# Patient Record
Sex: FEMALE | Employment: OTHER | URBAN - METROPOLITAN AREA
[De-identification: names, ages, dates, MRNs, and addresses within clinical notes are randomized per-mention and may not be internally consistent; named-entity substitution may affect disease eponyms.]

---

## 2018-09-19 ENCOUNTER — OFFICE VISIT (OUTPATIENT)
Dept: VASCULAR SURGERY | Facility: CLINIC | Age: 73
End: 2018-09-19
Payer: COMMERCIAL

## 2018-09-19 DIAGNOSIS — Z53.9 ERRONEOUS ENCOUNTER--DISREGARD: Primary | ICD-10-CM

## 2018-09-19 PROCEDURE — 99204 OFFICE O/P NEW MOD 45 MIN: CPT | Performed by: SURGERY

## 2018-09-19 NOTE — PROGRESS NOTES
"[facility] Consultation    Irasema Stahl Is a 73-year-old female who presents with recurrent bilateral lower extremity varicose veins and leg pain.  She has had apparent vein stripping in the remote past and then had a left lower extremity laser ablation about 1 year ago.  She states she developed significant pain in the left thigh following the laser ablation which was very slow to resolve.  Approximately 6 months after the laser ablation, she developed a new varicosity on her posterior medial left calf which is painful, causing a pinching pain which is worse when she stands for long periods of time and improves with elevation leg and with walking.  She has worn compression hose for more than 3 months but cannot tolerate them as they are very painful for her just below her knees.  She simply cannot wear them.    She has no history of deep vein thrombosis but had superficial thrombophlebitis found at the time of her ultrasound for her most recent laser ablation.  She states that the \"clot was removed.\"    She feels that her symptoms interfere with actives of daily living making it difficult for her to be on her feet for any long periods of time.    Past medical history  Medical: Hypertension and bladder problems.She apparently has had recurrent urinary tract infections is on suppressive antibiotics  Surgical: Laser ablation as above    Medicines: Cephalexin    Allergies: NKA    Social history: She is a non-smoker and does not use alcohol.    12 point review of systems was completed and was reviewed and is significant for 3 pound weight gain, bladder infections, arthritis, leg swelling, headaches, weakness, constipation and heartburn    Physical exam  General: Pleasant female in no acute distress.  She is 5 feet 3 inches tall was 167 pounds  HEENT: Normocephalic, atraumatic.  EOMI.  External ears and nose normal.  Psychiatric: Judgment, insight, mood and affect are normal.  Respiratory: Normal respiratory " effort  Cardiovascular: Pulse is regular  Musculoskeletal: Gait and station are normal the joints of her fingers and toes are without significant deformity.  There is no cyanosis of her nailbeds.  Neurologic: Grossly normal  Extremities: Right lower extremity, she has 8 mm varicosities coursing down the medial and posterior medial right calf..  There are no significant venous stasis changes of the right leg.  There is 1+ edema  In the left lower extremity, she has scattered 4-6 mm varicosities about the left medial and posterior medial calf.  There is a sizable varicosity/bleb-like varicose vein on the posterior medial left calf with very thin skin overlying it.  She is worried that this is at risk for hemorrhage.  4-6 mm varicosities about the medial left calf.  There are no other significant there are a few scattered telangiectasias.  She has 2+ dorsalis pedis and posterior tibial pulses bilaterally.    Impression  CEAP 3 chronic venous insufficiency bilateral lower extremities.  Her symptoms seem to be worse in the left and in the right leg.  She has had apparent bilateral stripping and had laser ablation of a vein in her left leg some 1 year ago.  Her symptoms did not improve significantly after that procedure.    She has worn compression finds them very uncomfortable and not tolerable.    We discussed options of continued conservative management with exercise, dietary measures, weight loss, compression and leg elevation.  The patient has pursued these measures and does not feel that they will be adequate to control her symptoms.    She will need an ultrasound of her left lower extremity veins to look for the source of her pain and varicose veins.  She will return in the near future.    BARRY Lee    Please send a copy of dictation to Dr. Olivia Swenson

## 2018-09-19 NOTE — MR AVS SNAPSHOT
"              After Visit Summary   2018    Irasema Stahl    MRN: [id number]           Patient Information     Date Of Birth          1945        Visit Information        Provider Department      2018 11:00 AM Vasquez Lee MD; [employer] [employer] [facility]      Today's Diagnoses     ERRONEOUS ENCOUNTER--DISREGARD    -  1       Follow-ups after your visit        Who to contact     If you have questions or need follow up information about today's clinic visit or your schedule please contact [facility] directly at [phone].  Normal or non-critical lab and imaging results will be communicated to you by MyChart, letter or phone within 4 business days after the clinic has received the results. If you do not hear from us within 7 days, please contact the clinic through [employer] or phone. If you have a critical or abnormal lab result, we will notify you by phone as soon as possible.  Submit refill requests through [employer] or call your pharmacy and they will forward the refill request to us. Please allow 3 business days for your refill to be completed.          Additional Information About Your Visit        MyChart Information     [employer] lets you send messages to your doctor, view your test results, renew your prescriptions, schedule appointments and more. To sign up, go to [email or web address] . Click on \"Log in\" on the left side of the screen, which will take you to the Welcome page. Then click on \"Sign up Now\" on the right side of the page.     You will be asked to enter the access code listed below, as well as some personal information. Please follow the directions to create your username and password.     Your access code is: [email or web address]  Expires: 2/3/2019  9:53 AM     Your access code will  in 90 days. If you need help or a new code, please call your [place] clinic or [phone].   "      Care EveryWhere ID     This is your Care EveryWhere ID. This could be used by other organizations to access your [place] medical records  [id number]         Blood Pressure from Last 3 Encounters:   No data found for BP    Weight from Last 3 Encounters:   No data found for Wt              Today, you had the following     No orders found for display       Primary Care Provider Office Phone # Fax #    Olivia Swenson [phone]       XXX NO INFO FOUND XXX XXX  XXX [place] [address]        Equal Access to Services     [facility] : Hadii aad ku hadasho Soomaali, waaxda luqadaha, qaybta kaalmada adeegyada, waxay idiin hayaan adekarolyn sigala . So [facility] [phone].    ATENCIÓN: Si habla español, tiene a cazares disposición servicios gratuitos de asistencia lingüística. Llame al [phone].    We comply with applicable federal civil rights laws and [place] laws. We do not discriminate on the basis of race, color, national origin, age, disability, sex, sexual orientation, or gender identity.            Thank you!     Thank you for choosing [facility]  for your care. Our goal is always to provide you with excellent care. Hearing back from our patients is one way we can continue to improve our services. Please take a few minutes to complete the written survey that you may receive in the mail after your visit with us. Thank you!             Your Updated Medication List - Protect others around you: Learn how to safely use, store and throw away your medicines at www.disposemymeds.org.      Notice  As of 9/19/2018 11:59 PM    You have not been prescribed any medications.

## 2018-10-17 ENCOUNTER — APPOINTMENT (OUTPATIENT)
Dept: VASCULAR SURGERY | Facility: CLINIC | Age: 73
End: 2018-10-17
Payer: COMMERCIAL

## 2018-10-17 PROCEDURE — 93971 EXTREMITY STUDY: CPT | Performed by: SURGERY

## 2018-10-24 ENCOUNTER — APPOINTMENT (OUTPATIENT)
Dept: VASCULAR SURGERY | Facility: CLINIC | Age: 73
End: 2018-10-24
Payer: COMMERCIAL

## 2018-10-24 PROCEDURE — 99207 ZZC VEINSOLUTIONS NO CHARGE VISIT: CPT | Performed by: SURGERY

## 2019-09-10 NOTE — TELEPHONE ENCOUNTER
MEDICAL RECORDS REQUEST   [facility]  Urology Clinic  [address] [place] [address]  PHONE: [phone]  Fax: [phone]        FUTURE VISIT INFORMATION                                                   Irasema Stahl : 1945 scheduled for future visit at [facility]    APPOINTMENT INFORMATION:    Date: 19 9:15AM     Provider:  Deisy Darby MD     Reason for Visit/Diagnosis: Bladder infection     REFERRAL INFORMATION:    Referring provider:  Self     Specialty: N/A    Referring providers clinic:  N/A     Clinic contact number:  N/A     RECORDS REQUESTED FOR VISIT                                                     NOTES  STATUS/DETAILS   OFFICE NOTE from referring provider  no   OFFICE NOTE from other specialist  yes   DISCHARGE SUMMARY from hospital  yes   DISCHARGE REPORT from the ER  yes   OPERATIVE REPORT  no   MEDICATION LIST  no   LABS     URINALYSIS (UA)  yes     PRE-VISIT CHECKLIST      Record collection complete Yes- All recs in epic    Appointment appropriately scheduled           (right time/right provider) Yes   MyChart activation If no, please explain: In process    Questionnaire complete If no, please explain: In process      Completed by: Steff Erwin

## 2019-09-13 ENCOUNTER — PRE VISIT (OUTPATIENT)
Dept: UROLOGY | Facility: CLINIC | Age: 74
End: 2019-09-13

## 2019-09-13 NOTE — TELEPHONE ENCOUNTER
Reason for visit: Cristina consult     Relevant information: pt is self referred    Records/imaging/labs/orders: records available    Pt called: no need for a call    At Rooming: dip/pvr

## 2019-09-19 ENCOUNTER — OFFICE VISIT (OUTPATIENT)
Dept: UROLOGY | Facility: CLINIC | Age: 74
End: 2019-09-19
Payer: COMMERCIAL

## 2019-09-19 ENCOUNTER — DOCUMENTATION ONLY (OUTPATIENT)
Dept: CARE COORDINATION | Facility: CLINIC | Age: 74
End: 2019-09-19

## 2019-09-19 ENCOUNTER — PRE VISIT (OUTPATIENT)
Dept: UROLOGY | Facility: CLINIC | Age: 74
End: 2019-09-19

## 2019-09-19 VITALS — WEIGHT: 170 LBS | HEIGHT: 62 IN | BODY MASS INDEX: 31.28 KG/M2

## 2019-09-19 DIAGNOSIS — D05.12 DUCTAL CARCINOMA IN SITU (DCIS) OF LEFT BREAST: ICD-10-CM

## 2019-09-19 DIAGNOSIS — N39.3 FEMALE STRESS INCONTINENCE: ICD-10-CM

## 2019-09-19 DIAGNOSIS — N39.0 RECURRENT UTI: Primary | ICD-10-CM

## 2019-09-19 DIAGNOSIS — R10.2 SUPRAPUBIC PAIN: ICD-10-CM

## 2019-09-19 DIAGNOSIS — K59.00 CONSTIPATION, UNSPECIFIED CONSTIPATION TYPE: ICD-10-CM

## 2019-09-19 PROBLEM — I10 ESSENTIAL HYPERTENSION: Status: ACTIVE | Noted: 2017-07-26

## 2019-09-19 PROBLEM — R07.9 ACUTE CHEST PAIN: Status: ACTIVE | Noted: 2019-01-02

## 2019-09-19 LAB
ALBUMIN UR-MCNC: NEGATIVE MG/DL
APPEARANCE UR: CLEAR
APPEARANCE UR: CLEAR
BILIRUB UR QL STRIP: NEGATIVE
BILIRUB UR QL: NORMAL
COLOR UR AUTO: YELLOW
COLOR UR: YELLOW
GLUCOSE UR STRIP-MCNC: NEGATIVE MG/DL
GLUCOSE URINE: NORMAL MG/DL
HGB UR QL STRIP: ABNORMAL
HGB UR QL: NORMAL
KETONES UR QL: NORMAL MG/DL
KETONES UR STRIP-MCNC: NEGATIVE MG/DL
LEUKOCYTE ESTERASE UR QL STRIP: NEGATIVE
LEUKOCYTE ESTERASE URINE: NORMAL
MUCOUS THREADS #/AREA URNS LPF: PRESENT /LPF
NITRATE UR QL: NEGATIVE
NITRITE UR QL STRIP: NORMAL
PH UR STRIP: 6 PH (ref 5–7)
PH UR STRIP: 7 PH (ref 5–7)
PROTEIN ALBUMIN URINE: NORMAL MG/DL
RBC #/AREA URNS AUTO: <1 /HPF (ref 0–2)
SOURCE: ABNORMAL
SOURCE: NORMAL
SP GR UR STRIP: 1.01 (ref 1–1.03)
SP GR UR STRIP: 1.01 (ref 1–1.03)
SQUAMOUS #/AREA URNS AUTO: 3 /HPF (ref 0–1)
UROBILINOGEN UR QL STRIP: 0.2 EU/DL (ref 0.2–1)
UROBILINOGEN UR STRIP-MCNC: 0 MG/DL (ref 0–2)
WBC #/AREA URNS AUTO: <1 /HPF (ref 0–5)

## 2019-09-19 RX ORDER — FAMOTIDINE 20 MG/1
TABLET, FILM COATED ORAL
COMMUNITY
Start: 2019-07-29 | End: 2020-09-16

## 2019-09-19 RX ORDER — CALCIUM CARBONATE 500 MG/1
1 TABLET, CHEWABLE ORAL DAILY
Status: ON HOLD | COMMUNITY
Start: 2019-05-05 | End: 2020-09-21

## 2019-09-19 RX ORDER — BACLOFEN 20 MG
1 TABLET ORAL AT BEDTIME
COMMUNITY
Start: 2019-08-15

## 2019-09-19 RX ORDER — HYDROCHLOROTHIAZIDE 12.5 MG/1
12.5 CAPSULE ORAL DAILY PRN
COMMUNITY
Start: 2019-09-09

## 2019-09-19 RX ORDER — ACETAMINOPHEN 500 MG
500-1000 TABLET ORAL EVERY 8 HOURS PRN
COMMUNITY

## 2019-09-19 RX ORDER — AMLODIPINE BESYLATE 2.5 MG/1
2.5 TABLET ORAL AT BEDTIME
COMMUNITY

## 2019-09-19 RX ORDER — ACETAMINOPHEN 160 MG
1 TABLET,DISINTEGRATING ORAL DAILY
COMMUNITY
Start: 2019-08-15

## 2019-09-19 RX ORDER — ESTRADIOL 0.1 MG/G
0.25 CREAM VAGINAL
COMMUNITY
Start: 2016-11-09 | End: 2020-09-16

## 2019-09-19 RX ORDER — MUPIROCIN 20 MG/G
OINTMENT TOPICAL
COMMUNITY
Start: 2019-07-17 | End: 2020-09-16

## 2019-09-19 RX ORDER — ASPIRIN 81 MG/1
81 TABLET, CHEWABLE ORAL DAILY
COMMUNITY

## 2019-09-19 RX ORDER — TRAZODONE HYDROCHLORIDE 50 MG/1
50 TABLET, FILM COATED ORAL
COMMUNITY
Start: 2019-09-13

## 2019-09-19 ASSESSMENT — PAIN SCALES - GENERAL: PAINLEVEL: MILD PAIN (2)

## 2019-09-19 ASSESSMENT — MIFFLIN-ST. JEOR: SCORE: 1234.36

## 2019-09-19 NOTE — PATIENT INSTRUCTIONS
Please do the blood work and CT scan    We will let you know the results of the urine test    Please return for a cystoscopy (procedure to look in the bladder) and pelvic exam    It was a pleasure meeting with you today.  Thank you for allowing me and my team the privilege of caring for you today.  YOU are the reason we are here, and I truly hope we provided you with the excellent service you deserve.  Please let us know if there is anything else we can do for you so that we can be sure you are leaving completely satisfied with your care experience.    Cystoscopy    Cystoscopy is a procedure that lets your doctor look directly inside your urethra and bladder. It can be used to:    Help diagnose a problem with your urethra, bladder, or kidneys.    Take a sample (biopsy) of bladder or urethral tissue.    Treat certain problems (such as removing kidney stones).    Place a stent to bypass an obstruction.    Take special X-rays of the kidneys.  Based on the findings, your doctor may recommend other tests or treatments.  What is a cystoscope?  A cystoscope is a telescope-like instrument that contains lenses and fiberoptics (small glass wires that make bright light). The cystoscope may be straight and rigid, or flexible to bend around curves in the urethra. The doctor may look directly into the cystoscope, or project the image onto a monitor.  Getting ready    Ask your doctor if you should stop taking any medicines before the procedure.    Follow any other instructions your doctor gives you.  Tell your doctor before the exam if you:    Take any medicines, such as aspirin or blood thinners    Have allergies to any medicines    Are pregnant   The procedure  Cystoscopy is done in the doctor s office, surgery center, or hospital. The doctor and a nurse are present during the procedure. It takes only a few minutes, longer if a biopsy, X-ray, or treatment needs to be done.  During the procedure:    You lie on an exam table on  your back, knees bent and legs apart. You are covered with a drape.    Your urethra and the area around it are washed. Anesthetic jelly may be applied to numb the urethra.    The cystoscope is inserted. A sterile fluid is put into the bladder to expand it. You may feel pressure from this fluid.    When the procedure is done, the cystoscope is removed.  After the procedure   Once you re home:    Drink plenty of fluids.    You may have burning or light bleeding when you urinate--this is normal.    Medicines may be prescribed to ease any discomfort or prevent infection. Take these as directed.    Call your doctor if you have heavy bleeding or blood clots, burning that lasts more than a day, a fever over 100 F  (38  C), or trouble urinating.  Date Last Reviewed: 1/1/2017 2000-2017 [employer]. [address] [place] [address]. All rights reserved. This information is not intended as a substitute for professional medical care. Always follow your healthcare professional's instructions.

## 2019-09-19 NOTE — LETTER
9/19/2019       RE: Irasema Stahl  [address]  [facility] [address]     Dear Colleague,    Thank you for referring your patient, Irasema Stahl, to the [facility] UROLOGY AND INST FOR PROSTATE AND UROLOGIC CANCERS at [facility]. Please see a copy of my visit note below.    We are pleased to see Ms. Irasema Stahl in consultation at the request of Dr. Peters for the evaluation of chief complaint listed below    Chief Complaint:    Dull pain in abdomen         History of Present Illness:   Irasema Stahl is a(n) 74 year old female w/ PMH breast cancer and long urologic history of frequent UTIs and urinary leakage presenting with constant dull suprapubic pain. For the past 8 years she has had symptoms of dysuria, increased frequency that have been diagnosed as UTIs and treated with cephalexin. She sees Dr. Peters, her ob/gyn, who has treated her UTIs multiple times. Most recently she had a UTI diagnosed on 8/28/2019 growing Enteroccos faecalis.. She took a 5 day course of cipro, remained symptomatic and returned to her physician, and was prescribed a 4 day course of ampicillin. Currently she has gone back to taking her cephalexin (as she has taken in the past) due to continued symptoms of dull suprapubic pain. She endorses leakage with coughing and valsalva. She wears a pad and changes it 3-4 times per day (mimial leakage, but for comfort). She currently denies gross hematuria, foul smelling urine.         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        Smoking: never  Alcohol: never  IV Drug Use: None         Family History:   History reviewed. No pertinent  "family history.  No urologic cancers in the family.         Allergies:   No Known Allergies         Medications:     Current Outpatient Medications   Medication Sig     acetaminophen (TYLENOL) 500 MG tablet Take 500-1,000 mg by mouth     amLODIPine (NORVASC) 2.5 MG tablet Take 2.5 mg by mouth     aspirin (ASA) 81 MG chewable tablet Take 81 mg by mouth     CALCIUM ANTACID 500 MG chewable tablet      cephALEXin (KEFLEX) 250 MG capsule      cholecalciferol (VITAMIN D3) 1000 units (25 mcg) capsule      Cholecalciferol (VITAMIN D3) 2000 units CAPS      estradiol (ESTRACE) 0.1 MG/GM vaginal cream Place 0.25 g vaginally     famotidine (PEPCID) 20 MG tablet      hydrochlorothiazide (MICROZIDE) 12.5 MG capsule      Magnesium Oxide 500 MG TABS      mupirocin (BACTROBAN) 2 % external ointment      traZODone (DESYREL) 50 MG tablet      No current facility-administered medications for this visit.             REVIEW OF SYSTEMS:    See HPI for pertinent details.  Remainder of 10-point ROS negative.         PHYSICAL EXAM   Ht 1.575 m (5' 2\")   Wt 77.1 kg (170 lb)   BMI 31.09 kg/m     Body mass index is 31.09 kg/m .  GENERAL: No acute distress. Well nourished.   HEENT:  Sclerae anicteric.  Conjunctivae pink.  Moist mucous membranes.  NECK:  Supple.  No lymphadenopathy.  CARDIAC:  Non cyanotic.  LUNGS:  Non-labored breathing  BACK:  No costovertebral tenderness.  ABDOMEN: Soft, non-tender, non distended.  SKIN: No rashes.  Dry.     EXTREMITIES:  Warm, well perfused.  mild lower extremity edema bilaterally.  NEURO: normal gait, no focal deficits.     PVR 0 mL.          LABS AND IMAGING:   Urinalysis 9/19 negative except for trace blood.          ASSESSMENT:   Irasema Stahl is a 74 year old female with history of breast cancer and long urologic history of recurrent UTIs, stress incontinence, constipation presents to clinic with continued dull, suprapubic pain that is not resolved with course of 2 antibiotics after most recently being " diagnosed with UTI on 8/28/19. Workup required for persistent UTIs.          PLAN:       Cystoscopy at next available appointment, with urine culture one week prior.    CT Urogram.     Ureaplasma and mycoplasma urine culture.    Check BMP.    Timothy Santoyo MD  Urology Resident    Addendum:    The patient was seen and evaluated with the resident.  The plan was formulated in conjunction with me and I agree with the above note with changes made as necessary.    25 minutes were spent with patient today, >50% in counseling and coordination of care    Deisy Darby MD MPH   of Urology    CC  Patient Care Team:  Olivia Swenson MD as PCP - General  Ziggy Patricia MD as MD (Orthopedics)    Copy to patient  HALEY KLEINHIDONALD  [address] [place] [id number]

## 2019-09-19 NOTE — PROGRESS NOTES
We are pleased to see Ms. Irasema Stahl in consultation at the request of Dr. Peters for the evaluation of chief complaint listed below    Chief Complaint:    Dull pain in abdomen         History of Present Illness:   Irasema Stahl is a(n) 74 year old female w/ PMH breast cancer and long urologic history of frequent UTIs and urinary leakage presenting with constant dull suprapubic pain. For the past 8 years she has had symptoms of dysuria, increased frequency that have been diagnosed as UTIs and treated with cephalexin. She sees Dr. Peters, her ob/gyn, who has treated her UTIs multiple times. Most recently she had a UTI diagnosed on 8/28/2019 growing Enteroccos faecalis.. She took a 5 day course of cipro, remained symptomatic and returned to her physician, and was prescribed a 4 day course of ampicillin. Currently she has gone back to taking her cephalexin (as she has taken in the past) due to continued symptoms of dull suprapubic pain. She endorses leakage with coughing and valsalva. She wears a pad and changes it 3-4 times per day (mimial leakage, but for comfort). She currently denies gross hematuria, foul smelling urine.         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        Smoking: never  Alcohol: never  IV Drug Use: None         Family History:   History reviewed. No pertinent family history.  No urologic cancers in the family.         Allergies:   No Known Allergies         Medications:     Current Outpatient Medications   Medication Sig     acetaminophen (TYLENOL) 500 MG tablet Take 500-1,000 mg by mouth     amLODIPine (NORVASC) 2.5 MG tablet Take 2.5 mg by mouth     aspirin (ASA) 81 MG chewable  "tablet Take 81 mg by mouth     CALCIUM ANTACID 500 MG chewable tablet      cephALEXin (KEFLEX) 250 MG capsule      cholecalciferol (VITAMIN D3) 1000 units (25 mcg) capsule      Cholecalciferol (VITAMIN D3) 2000 units CAPS      estradiol (ESTRACE) 0.1 MG/GM vaginal cream Place 0.25 g vaginally     famotidine (PEPCID) 20 MG tablet      hydrochlorothiazide (MICROZIDE) 12.5 MG capsule      Magnesium Oxide 500 MG TABS      mupirocin (BACTROBAN) 2 % external ointment      traZODone (DESYREL) 50 MG tablet      No current facility-administered medications for this visit.             REVIEW OF SYSTEMS:    See HPI for pertinent details.  Remainder of 10-point ROS negative.         PHYSICAL EXAM   Ht 1.575 m (5' 2\")   Wt 77.1 kg (170 lb)   BMI 31.09 kg/m    Body mass index is 31.09 kg/m .  GENERAL: No acute distress. Well nourished.   HEENT:  Sclerae anicteric.  Conjunctivae pink.  Moist mucous membranes.  NECK:  Supple.  No lymphadenopathy.  CARDIAC:  Non cyanotic.  LUNGS:  Non-labored breathing  BACK:  No costovertebral tenderness.  ABDOMEN: Soft, non-tender, non distended.  SKIN: No rashes.  Dry.     EXTREMITIES:  Warm, well perfused.  mild lower extremity edema bilaterally.  NEURO: normal gait, no focal deficits.     PVR 0 mL.          LABS AND IMAGING:   Urinalysis 9/19 negative except for trace blood.          ASSESSMENT:   Irasema Stahl is a 74 year old female with history of breast cancer and long urologic history of recurrent UTIs, stress incontinence, constipation presents to clinic with continued dull, suprapubic pain that is not resolved with course of 2 antibiotics after most recently being diagnosed with UTI on 8/28/19. Workup required for persistent UTIs.          PLAN:       Cystoscopy at next available appointment, with urine culture one week prior.    CT Urogram.     Ureaplasma and mycoplasma urine culture.    Check BMP.    Timothy Santoyo MD  Urology Resident    Addendum:    The patient was seen and " evaluated with the resident.  The plan was formulated in conjunction with me and I agree with the above note with changes made as necessary.    25 minutes were spent with patient today, >50% in counseling and coordination of care    Deisy Darby MD MPH   of Urology    CC  Patient Care Team:  Olivia Swenson MD as PCP - General  Ziggy Patricia MD as MD (Orthopedics)  Deisy Darby MD as MD (Urology)  SELF, REFERRED            Copy to patient  HALEY OWENS  [address] [place] [phone]

## 2019-09-19 NOTE — NURSING NOTE
"Chief Complaint   Patient presents with     Consult     Bladder infection, rUTIs       Height 1.575 m (5' 2\"), weight 77.1 kg (170 lb). Body mass index is 31.09 kg/m .    Patient Active Problem List   Diagnosis     Back disorder     Constipation     Ductal carcinoma in situ (DCIS) of left breast     Essential hypertension     Health care maintenance     HX: breast cancer     Omental infarction (H)     Acute chest pain       No Known Allergies    Current Outpatient Medications   Medication Sig Dispense Refill     acetaminophen (TYLENOL) 500 MG tablet Take 500-1,000 mg by mouth       amLODIPine (NORVASC) 2.5 MG tablet Take 2.5 mg by mouth       aspirin (ASA) 81 MG chewable tablet Take 81 mg by mouth       CALCIUM ANTACID 500 MG chewable tablet        cephALEXin (KEFLEX) 250 MG capsule        cholecalciferol (VITAMIN D3) 1000 units (25 mcg) capsule        Cholecalciferol (VITAMIN D3) 2000 units CAPS        estradiol (ESTRACE) 0.1 MG/GM vaginal cream Place 0.25 g vaginally       famotidine (PEPCID) 20 MG tablet        hydrochlorothiazide (MICROZIDE) 12.5 MG capsule        Magnesium Oxide 500 MG TABS        mupirocin (BACTROBAN) 2 % external ointment        traZODone (DESYREL) 50 MG tablet          Social History     Tobacco Use     Smoking status: Never Smoker     Smokeless tobacco: Never Used   Substance Use Topics     Alcohol use: None     Drug use: None       Peg Marquez, EMT  9/19/2019  9:29 AM     "

## 2019-09-23 ENCOUNTER — ANCILLARY PROCEDURE (OUTPATIENT)
Dept: CT IMAGING | Facility: CLINIC | Age: 74
End: 2019-09-23
Attending: UROLOGY
Payer: COMMERCIAL

## 2019-09-23 DIAGNOSIS — N39.0 RECURRENT UTI: ICD-10-CM

## 2019-09-23 DIAGNOSIS — R10.2 SUPRAPUBIC PAIN: ICD-10-CM

## 2019-09-23 DIAGNOSIS — D05.12 DUCTAL CARCINOMA IN SITU (DCIS) OF LEFT BREAST: ICD-10-CM

## 2019-09-23 LAB
CREAT BLD-MCNC: 0.9 MG/DL (ref 0.52–1.04)
GFR SERPL CREATININE-BSD FRML MDRD: 62 ML/MIN/{1.73_M2}

## 2019-09-23 RX ORDER — IOPAMIDOL 755 MG/ML
104 INJECTION, SOLUTION INTRAVASCULAR ONCE
Status: COMPLETED | OUTPATIENT
Start: 2019-09-23 | End: 2019-09-23

## 2019-09-23 RX ADMIN — IOPAMIDOL 104 ML: 755 INJECTION, SOLUTION INTRAVASCULAR at 09:24

## 2019-09-25 LAB
BACTERIA SPEC CULT: ABNORMAL
SPECIMEN SOURCE: ABNORMAL

## 2019-09-27 LAB
BACTERIA SPEC CULT: NORMAL
SPECIMEN SOURCE: NORMAL

## 2019-10-01 DIAGNOSIS — A49.3 MYCOPLASMA INFECTION: Primary | ICD-10-CM

## 2019-10-02 RX ORDER — DOXYCYCLINE 100 MG/1
100 CAPSULE ORAL 2 TIMES DAILY
Qty: 14 CAPSULE | Refills: 0 | Status: SHIPPED | OUTPATIENT
Start: 2019-10-02 | End: 2019-10-31

## 2019-10-15 ENCOUNTER — PRE VISIT (OUTPATIENT)
Dept: UROLOGY | Facility: CLINIC | Age: 74
End: 2019-10-15

## 2019-10-15 NOTE — TELEPHONE ENCOUNTER
Reason for visit: cystoscopy      Relevant information: constant dull suprapubic pain    Records/imaging/labs/orders: all records available, labs scheduled    Pt called: no need for a call    At Rooming: regular

## 2019-10-24 DIAGNOSIS — N39.0 URINARY TRACT INFECTION: Primary | ICD-10-CM

## 2019-10-24 DIAGNOSIS — N39.0 RECURRENT UTI: ICD-10-CM

## 2019-10-24 DIAGNOSIS — N39.0 URINARY TRACT INFECTION: ICD-10-CM

## 2019-10-24 DIAGNOSIS — R10.2 SUPRAPUBIC PAIN: ICD-10-CM

## 2019-10-24 LAB
ANION GAP SERPL CALCULATED.3IONS-SCNC: 6 MMOL/L (ref 3–14)
BUN SERPL-MCNC: 21 MG/DL (ref 7–30)
CALCIUM SERPL-MCNC: 8.5 MG/DL (ref 8.5–10.1)
CHLORIDE SERPL-SCNC: 104 MMOL/L (ref 94–109)
CO2 SERPL-SCNC: 29 MMOL/L (ref 20–32)
CREAT SERPL-MCNC: 0.81 MG/DL (ref 0.52–1.04)
GFR SERPL CREATININE-BSD FRML MDRD: 72 ML/MIN/{1.73_M2}
GLUCOSE SERPL-MCNC: 98 MG/DL (ref 70–99)
POTASSIUM SERPL-SCNC: 3.4 MMOL/L (ref 3.4–5.3)
SODIUM SERPL-SCNC: 140 MMOL/L (ref 133–144)

## 2019-10-31 ENCOUNTER — OFFICE VISIT (OUTPATIENT)
Dept: UROLOGY | Facility: CLINIC | Age: 74
End: 2019-10-31
Payer: COMMERCIAL

## 2019-10-31 VITALS
BODY MASS INDEX: 31.28 KG/M2 | HEIGHT: 62 IN | WEIGHT: 170 LBS | HEART RATE: 60 BPM | SYSTOLIC BLOOD PRESSURE: 136 MMHG | DIASTOLIC BLOOD PRESSURE: 79 MMHG

## 2019-10-31 DIAGNOSIS — R10.2 SUPRAPUBIC PAIN: ICD-10-CM

## 2019-10-31 DIAGNOSIS — N39.0 RECURRENT UTI: Primary | ICD-10-CM

## 2019-10-31 DIAGNOSIS — N81.2 UTEROVAGINAL PROLAPSE, INCOMPLETE: ICD-10-CM

## 2019-10-31 DIAGNOSIS — M62.89 PELVIC FLOOR DYSFUNCTION: ICD-10-CM

## 2019-10-31 LAB
APPEARANCE UR: CLEAR
BILIRUB UR QL: ABNORMAL
COLOR UR: YELLOW
GLUCOSE URINE: ABNORMAL MG/DL
HGB UR QL: ABNORMAL
KETONES UR QL: ABNORMAL MG/DL
LEUKOCYTE ESTERASE URINE: ABNORMAL
NITRITE UR QL STRIP: ABNORMAL
PH UR STRIP: 5.5 PH (ref 5–7)
PROTEIN ALBUMIN URINE: ABNORMAL MG/DL
SOURCE: ABNORMAL
SP GR UR STRIP: 1.02 (ref 1–1.03)
UROBILINOGEN UR QL STRIP: 0.2 EU/DL (ref 0.2–1)

## 2019-10-31 RX ORDER — LIDOCAINE HYDROCHLORIDE 20 MG/ML
10 JELLY TOPICAL ONCE
Status: COMPLETED | OUTPATIENT
Start: 2019-10-31 | End: 2019-10-31

## 2019-10-31 RX ADMIN — LIDOCAINE HYDROCHLORIDE 10 ML: 20 JELLY TOPICAL at 08:51

## 2019-10-31 ASSESSMENT — MIFFLIN-ST. JEOR: SCORE: 1234.36

## 2019-10-31 ASSESSMENT — PAIN SCALES - GENERAL: PAINLEVEL: NO PAIN (0)

## 2019-10-31 NOTE — PATIENT INSTRUCTIONS
"Websites with free information:    [employer] patient website: www.voicesforpfd.org    Total Control Program: www.totalcontrolprogram.com    If you think you have an infection please come in for a catheterized urine specimen    Please see one of the dedicated pelvic floor physical therapist ([facility] [phone])    Please return to see me in 3 months, sooner if needed    It was a pleasure meeting with you today.  Thank you for allowing me and my team the privilege of caring for you today.  YOU are the reason we are here, and I truly hope we provided you with the excellent service you deserve.  Please let us know if there is anything else we can do for you so that we can be sure you are leaving completely satisfied with your care experience.    AFTER YOUR CYSTOSCOPY        You have just completed a cystoscopy, or \"cysto\", which allowed your physician to learn more about your bladder (or to remove a stent placed after surgery). We suggest that you continue to avoid caffeine, fruit juice, and alcohol for the next 24 hours, however, you are encouraged to return to your normal activities.         A few things that are considered normal after your cystoscopy:     * Small amount of bleeding (or spotting) that clears within the next 24 hours     * Slight burning sensation with urination     * Sensation to of needing to avoid more frequently     * The feeling of \"air\" in your urine     * Mild discomfort that is relieved with Tylenol        Please contact our office promptly if you:     * Develop a fever above 101 degrees     * Are unable to urinate     * Develop bright red blood that does not stop     * Severe pain or swelling         Please contact our office with any concerns or questions @[phone].  "

## 2019-10-31 NOTE — PROGRESS NOTES
"October 31, 2019    Return visit    Patient returns today for follow up.  She is does speak some English but the visit today is conducted with the assistance of a Latvian .  Patient states that the doxycycline helped her symptoms.  She continues on her daily cephalexin. She denies any changes in her health since last visit.    /79   Pulse 60   Ht 1.575 m (5' 2\")   Wt 77.1 kg (170 lb)   BMI 31.09 kg/m    She is comfortable, in no distress, non-labored breathing.  Abdomen is soft, non-tender, non-distended.  Normal external female genitalia.  Negative CST initially but positive after bladder filled for cystoscopy.  Pelvic exam is remarkable for inability to localize the pelvic floor musculature, she has stage II prolapse with the anterior wall at 0    Cystoscopy Note: After informed consent was obtained patient was prepped and draped in the standard fashion.  The flexible cystoscope was inserted into a normal appearing urethral meatus.  The urothelium was carefully examined and there was evidence of squamous metaplasia in the trigone.  There were no tumors, masses, stones, foreign bodies, or other urothelial abnormalities noted.  Bilateral ureteral orifices were noted in the normal orthotopic position and both effluxed clear urine.  The cystoscope was retroflexed and the bladder neck was unremarkable.  The urethra was carefully examined upon removing the cystoscope and was unremarkable.  Patient tolerated the procedure without complications noted.      A/P: 74 year old F with history of breast cancer, Cristina, stress incontinence, constipation, suprapubic pain, stage II prolapse not bothersome    We discussed that pelvic organ prolapse is essentially a woman's hernia and that although bothersome, by itself is not life threatening.  We discussed that there are multiple options for treatment including observation, pessary, and surgery.  She is not bothered by the bulge and thus recommend observation at " this time unless she wants to try a pessary    We discussed how her pelvic floor symptoms are related to the physical exam findings and her pelvic floor myofascial dysfunction.  We discussed how the recommended treatment is dedicated pelvic floor therapy.  We discussed how the pelvic floor physical therapy works and patient is agreeable.  Referral was placed.      Not convinced that all her symptoms are related to infection but she will continue the cephalexin prophylaxis at this time for now    RTC 3 months, sooner if needed    Esthela Darby MD MPH   of Urology    CC  Patient Care Team:  Melchor Martinez MD as PCP - General (Internal Medicine)  Ziggy Patricia MD as MD (Orthopedics)  Esthela Darby MD as MD (Urology)  ESTHELA DARBY

## 2019-10-31 NOTE — NURSING NOTE
"Chief Complaint   Patient presents with     RECHECK     Cystoscopy       Blood pressure 136/79, pulse 60, height 1.575 m (5' 2\"), weight 77.1 kg (170 lb). Body mass index is 31.09 kg/m .    Patient Active Problem List   Diagnosis     Back disorder     Constipation     Ductal carcinoma in situ (DCIS) of left breast     Essential hypertension     Health care maintenance     HX: breast cancer     Omental infarction (H)     Acute chest pain     Recurrent UTI     Suprapubic pain     Female stress incontinence     Uterovaginal prolapse, incomplete       No Known Allergies    Current Outpatient Medications   Medication Sig Dispense Refill     acetaminophen (TYLENOL) 500 MG tablet Take 500-1,000 mg by mouth       amLODIPine (NORVASC) 2.5 MG tablet Take 2.5 mg by mouth       aspirin (ASA) 81 MG chewable tablet Take 81 mg by mouth       CALCIUM ANTACID 500 MG chewable tablet        cephALEXin (KEFLEX) 250 MG capsule        cholecalciferol (VITAMIN D3) 1000 units (25 mcg) capsule        Cholecalciferol (VITAMIN D3) 2000 units CAPS        estradiol (ESTRACE) 0.1 MG/GM vaginal cream Place 0.25 g vaginally       famotidine (PEPCID) 20 MG tablet        hydrochlorothiazide (MICROZIDE) 12.5 MG capsule        Magnesium Oxide 500 MG TABS        mupirocin (BACTROBAN) 2 % external ointment        traZODone (DESYREL) 50 MG tablet        VITAMIN D PO          Social History     Tobacco Use     Smoking status: Never Smoker     Smokeless tobacco: Never Used   Substance Use Topics     Alcohol use: None     Drug use: None       Invasive Procedure Safety Checklist:    Procedure: Cystoscopy    Action: Complete sections and checkboxes as appropriate.    Pre-procedure:  1. Patient ID Verified with 2 identifiers (Darlyn and  or MRN) : YES    2. Procedure and site verified with patient/designee (when able) : YES    3. Accurate consent documentation in medical record : YES    4. H&P (or appropriate assessment) documented in medical record : " N/A  H&P must be up to 30 days prior to procedure an updated within 24 hours of                 Procedure as applicable.     5. Relevant diagnostic and radiology test results appropriately labeled and displayed as applicable : YES    6. Blood products, implants, devices, and/or special equipment available for the procedure as applicable : YES    7. Procedure site(s) marked with provider initials [Exclusions: none] : NO    8. Marking not required. Reason : Yes  Procedure does not require site marking    Time Out:     Time-Out performed immediately prior to starting procedure, including verbal and active participation of all team members addressing: YES    1. Correct patient identity.  2. Confirmed that the correct side and site are marked.  3. An accurate procedure to be done.  4. Agreement on the procedure to be done.  5. Correct patient position.  6. Relevant images and results are properly labeled and appropriately displayed.  7. The need to administer antibiotics or fluids for irrigation purposes during the procedure as applicable.  8. Safety precautions based on patient history or medication use.    During Procedure: Verification of correct person, site, and procedure occurs any time the responsibility for care of the patient is transferred to another member of the care team.    The following medication was given:     MEDICATION: Lidocaine Uro-Jet 2% 200mg (20mg/mL)  ROUTE: Urethral   SITE: Urethra   DOSE: 10mL  LOT #: [id number]  : [employer]   EXPIRATION DATE: 6/21  NDC#: [address]-68   Was there drug waste? No    Prior to injection, verified patient identity using patient's name and date of birth.  Due to injection administration, patient instructed to remain in clinic for 15 minutes  afterwards, and to report any adverse reaction to me immediately.    Drug Amount Wasted:  None.  Vial/Syringe: Single dose vial      Meghan Arboleda CMA  10/31/2019  8:50 AM

## 2019-10-31 NOTE — PROGRESS NOTES
Invasive Procedure Safety Checklist:    Procedure: Cystoscopy    Action: Complete sections and checkboxes as appropriate.    Pre-procedure:  1. Patient ID Verified with 2 identifiers (Darlyn and  or MRN) : YES    2. Procedure and site verified with patient/designee (when able) : YES    3. Accurate consent documentation in medical record : YES    4. H&P (or appropriate assessment) documented in medical record : NO  H&P must be up to 30 days prior to procedure an updated within 24 hours of                 Procedure as applicable.     5. Relevant diagnostic and radiology test results appropriately labeled and displayed as applicable : NO    6. Blood products, implants, devices, and/or special equipment available for the procedure as applicable : NO    7. Procedure site(s) marked with provider initials [Exclusions: ] : NO    8. Marking not required. Reason : Yes  Procedure does not require site marking    Time Out:     Time-Out performed immediately prior to starting procedure, including verbal and active participation of all team members addressing: YES    1. Correct patient identity.  2. Confirmed that the correct side and site are marked.  3. An accurate procedure to be done.  4. Agreement on the procedure to be done.  5. Correct patient position.  6. Relevant images and results are properly labeled and appropriately displayed.  7. The need to administer antibiotics or fluids for irrigation purposes during the procedure as applicable.  8. Safety precautions based on patient history or medication use.    During Procedure: Verification of correct person, site, and procedure occurs any time the responsibility for care of the patient is transferred to another member of the care team.    Tameka Murphy, [facility]

## 2019-10-31 NOTE — LETTER
10/31/2019       RE: Irasema Stahl  [address]  [place] [address]     Dear Colleague,    Thank you for referring your patient, Irasema Stahl, to the [facility] UROLOGY AND INST FOR PROSTATE AND UROLOGIC CANCERS at [facility]. Please see a copy of my visit note below.    Invasive Procedure Safety Checklist:    Procedure: Cystoscopy    Action: Complete sections and checkboxes as appropriate.    Pre-procedure:  1. Patient ID Verified with 2 identifiers (Darlyn and  or MRN) : YES    2. Procedure and site verified with patient/designee (when able) : YES    3. Accurate consent documentation in medical record : YES    4. H&P (or appropriate assessment) documented in medical record : NO  H&P must be up to 30 days prior to procedure an updated within 24 hours of                 Procedure as applicable.     5. Relevant diagnostic and radiology test results appropriately labeled and displayed as applicable : NO    6. Blood products, implants, devices, and/or special equipment available for the procedure as applicable : NO    7. Procedure site(s) marked with provider initials [Exclusions: ] : NO    8. Marking not required. Reason : Yes  Procedure does not require site marking    Time Out:     Time-Out performed immediately prior to starting procedure, including verbal and active participation of all team members addressing: YES    1. Correct patient identity.  2. Confirmed that the correct side and site are marked.  3. An accurate procedure to be done.  4. Agreement on the procedure to be done.  5. Correct patient position.  6. Relevant images and results are properly labeled and appropriately displayed.  7. The need to administer antibiotics or fluids for irrigation purposes during the procedure as applicable.  8. Safety precautions based on patient history or medication use.    During Procedure: Verification of correct person, site, and procedure occurs any time the  "responsibility for care of the patient is transferred to another member of the care team.    Tameka Murphy CMA    October 31, 2019    Return visit    Patient returns today for follow up.  She is does speak some English but the visit today is conducted with the assistance of a Sammarinese .  Patient states that the doxycycline helped her symptoms.  She continues on her daily cephalexin. She denies any changes in her health since last visit.    /79   Pulse 60   Ht 1.575 m (5' 2\")   Wt 77.1 kg (170 lb)   BMI 31.09 kg/m     She is comfortable, in no distress, non-labored breathing.  Abdomen is soft, non-tender, non-distended.  Normal external female genitalia.  Negative CST initially but positive after bladder filled for cystoscopy.  Pelvic exam is remarkable for inability to localize the pelvic floor musculature, she has stage II prolapse with the anterior wall at 0    Cystoscopy Note: After informed consent was obtained patient was prepped and draped in the standard fashion.  The flexible cystoscope was inserted into a normal appearing urethral meatus.  The urothelium was carefully examined and there was evidence of squamous metaplasia in the trigone.  There were no tumors, masses, stones, foreign bodies, or other urothelial abnormalities noted.  Bilateral ureteral orifices were noted in the normal orthotopic position and both effluxed clear urine.  The cystoscope was retroflexed and the bladder neck was unremarkable.  The urethra was carefully examined upon removing the cystoscope and was unremarkable.  Patient tolerated the procedure without complications noted.      A/P: 74 year old F with history of breast cancer, Cristina, stress incontinence, constipation, suprapubic pain, stage II prolapse not bothersome    We discussed that pelvic organ prolapse is essentially a woman's hernia and that although bothersome, by itself is not life threatening.  We discussed that there are multiple options for " treatment including observation, pessary, and surgery.  She is not bothered by the bulge and thus recommend observation at this time unless she wants to try a pessary    We discussed how her pelvic floor symptoms are related to the physical exam findings and her pelvic floor myofascial dysfunction.  We discussed how the recommended treatment is dedicated pelvic floor therapy.  We discussed how the pelvic floor physical therapy works and patient is agreeable.  Referral was placed.      Not convinced that all her symptoms are related to infection but she will continue the cephalexin prophylaxis at this time for now    RTC 3 months, sooner if needed    Esthela Darby MD MPH   of Urology    CC  Patient Care Team:  Melchor Martinez MD as PCP - General (Internal Medicine)  Ziggy Patricia MD as MD (Orthopedics)  Esthela Darby MD as MD (Urology)  ESTHELA DARBY

## 2019-11-01 LAB
BACTERIA SPEC CULT: ABNORMAL
SPECIMEN SOURCE: ABNORMAL

## 2020-01-23 ENCOUNTER — PRE VISIT (OUTPATIENT)
Dept: UROLOGY | Facility: CLINIC | Age: 75
End: 2020-01-23

## 2020-01-23 NOTE — TELEPHONE ENCOUNTER
Reason for visit: Symptom check     Relevant information: pelvic floor dysfunction    Records/imaging/labs/orders: pt referred to physical therapy    Pt called: no need for a call    At Rooming: regular

## 2020-08-20 DIAGNOSIS — Z11.59 ENCOUNTER FOR SCREENING FOR OTHER VIRAL DISEASES: Primary | ICD-10-CM

## 2020-09-16 RX ORDER — SULFAMETHOXAZOLE/TRIMETHOPRIM 800-160 MG
1 TABLET ORAL 2 TIMES DAILY
Status: ON HOLD | COMMUNITY
End: 2020-09-21

## 2020-09-16 RX ORDER — MULTIVIT WITH MINERALS/LUTEIN
1000 TABLET ORAL DAILY PRN
COMMUNITY

## 2020-09-16 RX ORDER — AMITRIPTYLINE HYDROCHLORIDE 10 MG/1
10 TABLET ORAL
Status: ON HOLD | COMMUNITY
End: 2020-09-21

## 2020-09-16 RX ORDER — GABAPENTIN 100 MG/1
100 CAPSULE ORAL 3 TIMES DAILY
Status: ON HOLD | COMMUNITY
End: 2020-09-21

## 2020-09-16 RX ORDER — PANTOPRAZOLE SODIUM 40 MG/1
40 TABLET, DELAYED RELEASE ORAL DAILY
Status: ON HOLD | COMMUNITY
End: 2020-09-21

## 2020-09-16 RX ORDER — ALUMINA, MAGNESIA, AND SIMETHICONE 2400; 2400; 240 MG/30ML; MG/30ML; MG/30ML
10 SUSPENSION ORAL EVERY 4 HOURS PRN
COMMUNITY

## 2020-09-16 RX ORDER — POLYETHYLENE GLYCOL 3350 17 G/17G
1 POWDER, FOR SOLUTION ORAL DAILY PRN
COMMUNITY

## 2020-09-16 SDOH — HEALTH STABILITY: MENTAL HEALTH: HOW OFTEN DO YOU HAVE A DRINK CONTAINING ALCOHOL?: NEVER

## 2020-09-16 ASSESSMENT — MIFFLIN-ST. JEOR: SCORE: 1190.82

## 2020-09-17 DIAGNOSIS — Z11.59 ENCOUNTER FOR SCREENING FOR OTHER VIRAL DISEASES: ICD-10-CM

## 2020-09-17 PROCEDURE — U0003 INFECTIOUS AGENT DETECTION BY NUCLEIC ACID (DNA OR RNA); SEVERE ACUTE RESPIRATORY SYNDROME CORONAVIRUS 2 (SARS-COV-2) (CORONAVIRUS DISEASE [COVID-19]), AMPLIFIED PROBE TECHNIQUE, MAKING USE OF HIGH THROUGHPUT TECHNOLOGIES AS DESCRIBED BY CMS-2020-01-R: HCPCS | Performed by: OBSTETRICS & GYNECOLOGY

## 2020-09-18 LAB
SARS-COV-2 RNA SPEC QL NAA+PROBE: NOT DETECTED
SPECIMEN SOURCE: NORMAL

## 2020-09-21 ENCOUNTER — ANESTHESIA (OUTPATIENT)
Dept: SURGERY | Facility: CLINIC | Age: 75
End: 2020-09-21
Payer: COMMERCIAL

## 2020-09-21 ENCOUNTER — HOSPITAL ENCOUNTER (OUTPATIENT)
Facility: CLINIC | Age: 75
Discharge: HOME | End: 2020-09-22
Attending: OBSTETRICS & GYNECOLOGY | Admitting: OBSTETRICS & GYNECOLOGY
Payer: COMMERCIAL

## 2020-09-21 ENCOUNTER — ANESTHESIA EVENT (OUTPATIENT)
Dept: SURGERY | Facility: CLINIC | Age: 75
End: 2020-09-21
Payer: COMMERCIAL

## 2020-09-21 DIAGNOSIS — Z98.890 POST-OPERATIVE STATE: Primary | ICD-10-CM

## 2020-09-21 PROCEDURE — 25800030 ZZH RX IP 258 OP 636: Performed by: OBSTETRICS & GYNECOLOGY

## 2020-09-21 PROCEDURE — 25000125 ZZHC RX 250: Performed by: OBSTETRICS & GYNECOLOGY

## 2020-09-21 PROCEDURE — 40000171 ZZH STATISTIC PRE-PROCEDURE ASSESSMENT III: Performed by: OBSTETRICS & GYNECOLOGY

## 2020-09-21 PROCEDURE — 25000125 ZZHC RX 250: Performed by: NURSE ANESTHETIST, CERTIFIED REGISTERED

## 2020-09-21 PROCEDURE — 93010 ELECTROCARDIOGRAM REPORT: CPT | Performed by: INTERNAL MEDICINE

## 2020-09-21 PROCEDURE — 27210794 ZZH OR GENERAL SUPPLY STERILE: Performed by: OBSTETRICS & GYNECOLOGY

## 2020-09-21 PROCEDURE — 37000008 ZZH ANESTHESIA TECHNICAL FEE, 1ST 30 MIN: Performed by: OBSTETRICS & GYNECOLOGY

## 2020-09-21 PROCEDURE — 25000132 ZZH RX MED GY IP 250 OP 250 PS 637: Performed by: OBSTETRICS & GYNECOLOGY

## 2020-09-21 PROCEDURE — 37000009 ZZH ANESTHESIA TECHNICAL FEE, EACH ADDTL 15 MIN: Performed by: OBSTETRICS & GYNECOLOGY

## 2020-09-21 PROCEDURE — 25000128 H RX IP 250 OP 636: Performed by: NURSE ANESTHETIST, CERTIFIED REGISTERED

## 2020-09-21 PROCEDURE — 25800030 ZZH RX IP 258 OP 636: Performed by: ANESTHESIOLOGY

## 2020-09-21 PROCEDURE — 25000128 H RX IP 250 OP 636: Performed by: OBSTETRICS & GYNECOLOGY

## 2020-09-21 PROCEDURE — C1771 REP DEV, URINARY, W/SLING: HCPCS | Performed by: OBSTETRICS & GYNECOLOGY

## 2020-09-21 PROCEDURE — 36000093 ZZH SURGERY LEVEL 4 1ST 30 MIN: Performed by: OBSTETRICS & GYNECOLOGY

## 2020-09-21 PROCEDURE — 71000012 ZZH RECOVERY PHASE 1 LEVEL 1 FIRST HR: Performed by: OBSTETRICS & GYNECOLOGY

## 2020-09-21 PROCEDURE — 36000063 ZZH SURGERY LEVEL 4 EA 15 ADDTL MIN: Performed by: OBSTETRICS & GYNECOLOGY

## 2020-09-21 DEVICE — MESH SLING ADVANTAGE MID URETHERAL BLUE M0068502120: Type: IMPLANTABLE DEVICE | Site: VAGINA | Status: FUNCTIONAL

## 2020-09-21 RX ORDER — LIDOCAINE HYDROCHLORIDE AND EPINEPHRINE 10; 10 MG/ML; UG/ML
INJECTION, SOLUTION INFILTRATION; PERINEURAL PRN
Status: DISCONTINUED | OUTPATIENT
Start: 2020-09-21 | End: 2020-09-21 | Stop reason: HOSPADM

## 2020-09-21 RX ORDER — OXYCODONE AND ACETAMINOPHEN 5; 325 MG/1; MG/1
1-2 TABLET ORAL EVERY 4 HOURS PRN
Status: DISCONTINUED | OUTPATIENT
Start: 2020-09-21 | End: 2020-09-22 | Stop reason: HOSPADM

## 2020-09-21 RX ORDER — ONDANSETRON 2 MG/ML
4 INJECTION INTRAMUSCULAR; INTRAVENOUS EVERY 30 MIN PRN
Status: DISCONTINUED | OUTPATIENT
Start: 2020-09-21 | End: 2020-09-21 | Stop reason: HOSPADM

## 2020-09-21 RX ORDER — MEPERIDINE HYDROCHLORIDE 25 MG/ML
12.5 INJECTION INTRAMUSCULAR; INTRAVENOUS; SUBCUTANEOUS
Status: DISCONTINUED | OUTPATIENT
Start: 2020-09-21 | End: 2020-09-21 | Stop reason: HOSPADM

## 2020-09-21 RX ORDER — DEXAMETHASONE SODIUM PHOSPHATE 4 MG/ML
INJECTION, SOLUTION INTRA-ARTICULAR; INTRALESIONAL; INTRAMUSCULAR; INTRAVENOUS; SOFT TISSUE PRN
Status: DISCONTINUED | OUTPATIENT
Start: 2020-09-21 | End: 2020-09-21

## 2020-09-21 RX ORDER — PROPOFOL 10 MG/ML
INJECTION, EMULSION INTRAVENOUS PRN
Status: DISCONTINUED | OUTPATIENT
Start: 2020-09-21 | End: 2020-09-21

## 2020-09-21 RX ORDER — LIDOCAINE HYDROCHLORIDE 10 MG/ML
INJECTION, SOLUTION INFILTRATION; PERINEURAL PRN
Status: DISCONTINUED | OUTPATIENT
Start: 2020-09-21 | End: 2020-09-21

## 2020-09-21 RX ORDER — HYDROCHLOROTHIAZIDE 12.5 MG/1
12.5 CAPSULE ORAL DAILY PRN
Status: DISCONTINUED | OUTPATIENT
Start: 2020-09-21 | End: 2020-09-22 | Stop reason: HOSPADM

## 2020-09-21 RX ORDER — POLYETHYLENE GLYCOL 3350 17 G/17G
17 POWDER, FOR SOLUTION ORAL DAILY
Status: DISCONTINUED | OUTPATIENT
Start: 2020-09-21 | End: 2020-09-22 | Stop reason: HOSPADM

## 2020-09-21 RX ORDER — ONDANSETRON 4 MG/1
4 TABLET, ORALLY DISINTEGRATING ORAL EVERY 6 HOURS PRN
Status: DISCONTINUED | OUTPATIENT
Start: 2020-09-21 | End: 2020-09-22 | Stop reason: HOSPADM

## 2020-09-21 RX ORDER — AMITRIPTYLINE HYDROCHLORIDE 10 MG/1
10 TABLET ORAL AT BEDTIME
Status: DISCONTINUED | OUTPATIENT
Start: 2020-09-21 | End: 2020-09-21 | Stop reason: CLARIF

## 2020-09-21 RX ORDER — SODIUM CHLORIDE, SODIUM LACTATE, POTASSIUM CHLORIDE, CALCIUM CHLORIDE 600; 310; 30; 20 MG/100ML; MG/100ML; MG/100ML; MG/100ML
INJECTION, SOLUTION INTRAVENOUS CONTINUOUS
Status: DISCONTINUED | OUTPATIENT
Start: 2020-09-21 | End: 2020-09-21 | Stop reason: HOSPADM

## 2020-09-21 RX ORDER — TRAZODONE HYDROCHLORIDE 50 MG/1
50 TABLET, FILM COATED ORAL
Status: DISCONTINUED | OUTPATIENT
Start: 2020-09-21 | End: 2020-09-22 | Stop reason: HOSPADM

## 2020-09-21 RX ORDER — ONDANSETRON 4 MG/1
4 TABLET, ORALLY DISINTEGRATING ORAL EVERY 30 MIN PRN
Status: DISCONTINUED | OUTPATIENT
Start: 2020-09-21 | End: 2020-09-21 | Stop reason: HOSPADM

## 2020-09-21 RX ORDER — CEFAZOLIN SODIUM 1 G/3ML
1 INJECTION, POWDER, FOR SOLUTION INTRAMUSCULAR; INTRAVENOUS SEE ADMIN INSTRUCTIONS
Status: DISCONTINUED | OUTPATIENT
Start: 2020-09-21 | End: 2020-09-21 | Stop reason: HOSPADM

## 2020-09-21 RX ORDER — PROCHLORPERAZINE MALEATE 5 MG
5 TABLET ORAL EVERY 6 HOURS PRN
Status: DISCONTINUED | OUTPATIENT
Start: 2020-09-21 | End: 2020-09-22 | Stop reason: HOSPADM

## 2020-09-21 RX ORDER — NALOXONE HYDROCHLORIDE 0.4 MG/ML
.1-.4 INJECTION, SOLUTION INTRAMUSCULAR; INTRAVENOUS; SUBCUTANEOUS
Status: DISCONTINUED | OUTPATIENT
Start: 2020-09-21 | End: 2020-09-21 | Stop reason: HOSPADM

## 2020-09-21 RX ORDER — PHENAZOPYRIDINE HYDROCHLORIDE 200 MG/1
200 TABLET, FILM COATED ORAL ONCE
Status: COMPLETED | OUTPATIENT
Start: 2020-09-21 | End: 2020-09-21

## 2020-09-21 RX ORDER — GABAPENTIN 100 MG/1
100 CAPSULE ORAL 3 TIMES DAILY
Status: DISCONTINUED | OUTPATIENT
Start: 2020-09-21 | End: 2020-09-21

## 2020-09-21 RX ORDER — NALOXONE HYDROCHLORIDE 0.4 MG/ML
.1-.4 INJECTION, SOLUTION INTRAMUSCULAR; INTRAVENOUS; SUBCUTANEOUS
Status: DISCONTINUED | OUTPATIENT
Start: 2020-09-21 | End: 2020-09-22 | Stop reason: HOSPADM

## 2020-09-21 RX ORDER — DIPHENHYDRAMINE HCL 12.5MG/5ML
12.5 LIQUID (ML) ORAL EVERY 6 HOURS PRN
Status: DISCONTINUED | OUTPATIENT
Start: 2020-09-21 | End: 2020-09-22 | Stop reason: HOSPADM

## 2020-09-21 RX ORDER — FENTANYL CITRATE 50 UG/ML
25-50 INJECTION, SOLUTION INTRAMUSCULAR; INTRAVENOUS
Status: DISCONTINUED | OUTPATIENT
Start: 2020-09-21 | End: 2020-09-21 | Stop reason: HOSPADM

## 2020-09-21 RX ORDER — HYDROMORPHONE HYDROCHLORIDE 1 MG/ML
0.2 INJECTION, SOLUTION INTRAMUSCULAR; INTRAVENOUS; SUBCUTANEOUS
Status: DISCONTINUED | OUTPATIENT
Start: 2020-09-21 | End: 2020-09-22 | Stop reason: HOSPADM

## 2020-09-21 RX ORDER — PROPOFOL 10 MG/ML
INJECTION, EMULSION INTRAVENOUS CONTINUOUS PRN
Status: DISCONTINUED | OUTPATIENT
Start: 2020-09-21 | End: 2020-09-21

## 2020-09-21 RX ORDER — BUPIVACAINE HYDROCHLORIDE 5 MG/ML
INJECTION, SOLUTION EPIDURAL; INTRACAUDAL PRN
Status: DISCONTINUED | OUTPATIENT
Start: 2020-09-21 | End: 2020-09-21 | Stop reason: HOSPADM

## 2020-09-21 RX ORDER — AMLODIPINE BESYLATE 2.5 MG/1
2.5 TABLET ORAL AT BEDTIME
Status: DISCONTINUED | OUTPATIENT
Start: 2020-09-21 | End: 2020-09-22 | Stop reason: HOSPADM

## 2020-09-21 RX ORDER — DIPHENHYDRAMINE HYDROCHLORIDE 50 MG/ML
12.5 INJECTION INTRAMUSCULAR; INTRAVENOUS EVERY 6 HOURS PRN
Status: DISCONTINUED | OUTPATIENT
Start: 2020-09-21 | End: 2020-09-22 | Stop reason: HOSPADM

## 2020-09-21 RX ORDER — ONDANSETRON 2 MG/ML
INJECTION INTRAMUSCULAR; INTRAVENOUS PRN
Status: DISCONTINUED | OUTPATIENT
Start: 2020-09-21 | End: 2020-09-21

## 2020-09-21 RX ORDER — CALCIUM CARBONATE 500 MG/1
1 TABLET, CHEWABLE ORAL 2 TIMES DAILY PRN
COMMUNITY

## 2020-09-21 RX ORDER — EPHEDRINE SULFATE 50 MG/ML
INJECTION, SOLUTION INTRAVENOUS PRN
Status: DISCONTINUED | OUTPATIENT
Start: 2020-09-21 | End: 2020-09-21

## 2020-09-21 RX ORDER — CEFAZOLIN SODIUM 2 G/100ML
2 INJECTION, SOLUTION INTRAVENOUS
Status: COMPLETED | OUTPATIENT
Start: 2020-09-21 | End: 2020-09-21

## 2020-09-21 RX ORDER — FENTANYL CITRATE 50 UG/ML
INJECTION, SOLUTION INTRAMUSCULAR; INTRAVENOUS PRN
Status: DISCONTINUED | OUTPATIENT
Start: 2020-09-21 | End: 2020-09-21

## 2020-09-21 RX ORDER — KETOROLAC TROMETHAMINE 15 MG/ML
15 INJECTION, SOLUTION INTRAMUSCULAR; INTRAVENOUS EVERY 6 HOURS
Status: DISCONTINUED | OUTPATIENT
Start: 2020-09-21 | End: 2020-09-22 | Stop reason: HOSPADM

## 2020-09-21 RX ORDER — ONDANSETRON 2 MG/ML
4 INJECTION INTRAMUSCULAR; INTRAVENOUS EVERY 6 HOURS PRN
Status: DISCONTINUED | OUTPATIENT
Start: 2020-09-21 | End: 2020-09-22 | Stop reason: HOSPADM

## 2020-09-21 RX ORDER — GLYCOPYRROLATE 0.2 MG/ML
INJECTION, SOLUTION INTRAMUSCULAR; INTRAVENOUS PRN
Status: DISCONTINUED | OUTPATIENT
Start: 2020-09-21 | End: 2020-09-21

## 2020-09-21 RX ORDER — SODIUM CHLORIDE 9 MG/ML
INJECTION, SOLUTION INTRAVENOUS CONTINUOUS
Status: DISCONTINUED | OUTPATIENT
Start: 2020-09-21 | End: 2020-09-22 | Stop reason: HOSPADM

## 2020-09-21 RX ORDER — LIDOCAINE 40 MG/G
CREAM TOPICAL
Status: DISCONTINUED | OUTPATIENT
Start: 2020-09-21 | End: 2020-09-21 | Stop reason: HOSPADM

## 2020-09-21 RX ADMIN — FENTANYL CITRATE 25 MCG: 50 INJECTION, SOLUTION INTRAMUSCULAR; INTRAVENOUS at 15:30

## 2020-09-21 RX ADMIN — PROPOFOL 150 MG: 10 INJECTION, EMULSION INTRAVENOUS at 13:41

## 2020-09-21 RX ADMIN — SODIUM CHLORIDE, POTASSIUM CHLORIDE, SODIUM LACTATE AND CALCIUM CHLORIDE: 600; 310; 30; 20 INJECTION, SOLUTION INTRAVENOUS at 13:34

## 2020-09-21 RX ADMIN — GLYCOPYRROLATE 0.2 MG: 0.2 INJECTION, SOLUTION INTRAMUSCULAR; INTRAVENOUS at 13:41

## 2020-09-21 RX ADMIN — CEFAZOLIN SODIUM 1 G: 2 INJECTION, SOLUTION INTRAVENOUS at 15:37

## 2020-09-21 RX ADMIN — ONDANSETRON HYDROCHLORIDE 4 MG: 2 INJECTION, SOLUTION INTRAVENOUS at 15:05

## 2020-09-21 RX ADMIN — FENTANYL CITRATE 50 MCG: 50 INJECTION, SOLUTION INTRAMUSCULAR; INTRAVENOUS at 14:02

## 2020-09-21 RX ADMIN — SODIUM CHLORIDE, POTASSIUM CHLORIDE, SODIUM LACTATE AND CALCIUM CHLORIDE: 600; 310; 30; 20 INJECTION, SOLUTION INTRAVENOUS at 15:09

## 2020-09-21 RX ADMIN — FENTANYL CITRATE 25 MCG: 50 INJECTION, SOLUTION INTRAMUSCULAR; INTRAVENOUS at 15:36

## 2020-09-21 RX ADMIN — EPHEDRINE SULFATE 5 MG: 50 INJECTION, SOLUTION INTRAVENOUS at 14:11

## 2020-09-21 RX ADMIN — PROPOFOL 75 MCG/KG/MIN: 10 INJECTION, EMULSION INTRAVENOUS at 13:51

## 2020-09-21 RX ADMIN — FENTANYL CITRATE 100 MCG: 50 INJECTION, SOLUTION INTRAMUSCULAR; INTRAVENOUS at 13:41

## 2020-09-21 RX ADMIN — CEFAZOLIN SODIUM 2 G: 2 INJECTION, SOLUTION INTRAVENOUS at 13:34

## 2020-09-21 RX ADMIN — PHENAZOPYRIDINE 200 MG: 200 TABLET ORAL at 12:25

## 2020-09-21 RX ADMIN — SODIUM CHLORIDE 50 ML/HR: 9 INJECTION, SOLUTION INTRAVENOUS at 22:34

## 2020-09-21 RX ADMIN — DEXAMETHASONE SODIUM PHOSPHATE 4 MG: 4 INJECTION, SOLUTION INTRA-ARTICULAR; INTRALESIONAL; INTRAMUSCULAR; INTRAVENOUS; SOFT TISSUE at 13:41

## 2020-09-21 RX ADMIN — FENTANYL CITRATE 50 MCG: 50 INJECTION, SOLUTION INTRAMUSCULAR; INTRAVENOUS at 15:04

## 2020-09-21 RX ADMIN — KETOROLAC TROMETHAMINE 15 MG: 15 INJECTION, SOLUTION INTRAMUSCULAR; INTRAVENOUS at 17:34

## 2020-09-21 RX ADMIN — ROCURONIUM BROMIDE 5 MG: 10 INJECTION INTRAVENOUS at 13:41

## 2020-09-21 RX ADMIN — LIDOCAINE HYDROCHLORIDE 30 MG: 10 INJECTION, SOLUTION INFILTRATION; PERINEURAL at 13:41

## 2020-09-21 RX ADMIN — EPHEDRINE SULFATE 5 MG: 50 INJECTION, SOLUTION INTRAVENOUS at 14:25

## 2020-09-21 ASSESSMENT — ACTIVITIES OF DAILY LIVING (ADL): AMBULATION: 0-->INDEPENDENT

## 2020-09-21 ASSESSMENT — ENCOUNTER SYMPTOMS: DYSRHYTHMIAS: 0

## 2020-09-21 ASSESSMENT — MIFFLIN-ST. JEOR
SCORE: 1174.13
SCORE: 1145.46

## 2020-09-21 NOTE — OP NOTE
OPERATIVE REPORT     NAME: Irasema Stahl   : 1945   MR#: [id number]     DATE OF OPERATION: 2020     SURGEON: Mandi Mosher MD     PREOPERATIVE DIAGNOSIS:     Incomplete uterovaginal prolapse.     Associated cystocele, rectocele, and enterocele.     Stress urinary incontinence     POSTOP DIAGNOSIS:     Incomplete uterovaginal prolapse.     Associated cystocele, rectocele, and enterocele.     Stress urinary incontinence     PROCEDURES :   1. LeFort colpocleisis repair of pelvic organ prolapse.   2. Anterior, Posterior and Enterocele repairs  3. Levator myorrhaphy   4. Perineorrhaphy  4. Retropubic midurethral sling.   5. Cystourethroscopy.     ASSISTANT:   ARISTEO Caldwell     ANESTHESIA:   GET    ESTIMATED BLOOD LOSS:   150 ml     IV FLUIDS:   1300 ml of crystalloid.     COMPLICATIONS:   None.     DRAINS:     16-Bengali Altman catheter to gravity drainage     PACKING:   none    FINDINGS:     Ureters patent, no bladder lesions     Normal rectal exam at conclusion of surgery     INDICATIONS:   This patient was seen in consultation regarding uterovaginal prolapse and urinary incontinence. Please refer to her clinic documentation for a complete description of her evaluation treatment plan she was desirous of a definitive surgical approach. Prior to the procedure, the risks, benefits, indications, and alternatives were discussed. Both written and verbal consent were obtained.     PROCEDURE:   The patient was brought to the operating suite. She was administered prophylactic IV antibiotics, had sequential compression devices present on her lower extremities. She was administered anesthesia, then carefully positioned in the dorsal lithotomy position with her legs stationed in the Yellofin stirrups with attention to avoiding pressure points. An exam under anesthesia was performed, which noted her cervix and uterus to be normal, no noted adnexal or parametrial masses. Anorectal exam no masses. She was  now sterilely prepped and draped in the usual fashion. Her bladder was drained with a straight catheterization.     LeFort colpocleisis.   The cervix was grasped with an Allis clamp. On the anterior vaginal wall a rhomboid/rectangular shape was drawn with a marking pen. The subepithelial tissue over the bladder was injected with a solution of Marcaine with epinephrine for hydrodistention purposes. Electrocautery was used to score the rectangle and then the tissue within the rectangle was dissected off the underlying perivesical connective tissue and discarded. A 2 cm sheet of epithelium was left on each side, and the posterior wall a rhomboid/rectangular shape was created, injected and the epithelium excised once again. Now in order to create 2 pericervical drainage canals a suture of 2-0 PDS was used to imbricate the cervix and then create the tunnels starting in the midline in the sewing out laterally on both sides. In order to involute the apical prolapse 8 sutures of 0-Vicryl were brought through the perivesical connective tissue in a radial fashion down around the tunnel and cervix and then through the perirectal connective tissue. Once all the sutures were placed the apical prolapse was reduced and the sutures were tied thus eliminating the apical prolapse. The remainder of the drainage tunnels were sewn and tied.     Anterior Colporrhaphy  The lateral perivesical connective tissue was plicated in the midline with interrupted sutures of 0-vicryl.     Retropubic midurethral sling:   Two marks were created on the anterior abdominal wall 2.5 cm lateral to midline at the level of the pubic bone. Attention was turned to the vagina, where an Allis clamp was applied 1 cm distal from the meatus, an additional Allis clamp 2.5 cm from the meatus. Periurethral tissue was injected with a solution of Marcaine with epinephrine. A 1.5 cm incision was created between the 2 Allis clamps beneath the mid urethra in the sagittal  plane. Two periurethral tunnels were then created out laterally towards the pubic bone. Once an adequate dissection had been performed, the Advantage Fit device was selected and loaded. Trocar was brought through the patient's left periurethral tunnel back behind the pubic bone, through the space of Retzius, and out through   the previously created thom on the anterior abdominal wall. The blue stay sheath was left in place. This was repeated in a similar fashion on the patient's right side. The urethra was diverted in ipsilateral fashion during trocar placement. Cystourethroscopy was now performed with the above noted normal findings (patent ureters, no lesions). The cystoscope was removed. The sling material was appropriately positioned beneath the mid urethra with no overt tension. The resultant incision was closed with a running locking suture of 2-0 Vicryl. Excess sling material on the anterior abdominal wall was trimmed. The resultant incisions were closed with Dermabond.     Enterocele repair and  Posterior colporrhaphy.   The enterocele at the apex of the vagina was obliterated using 0-vicryl suture in a circumferential fashion.  The posterior rectocele defect was now imbricated in the midline in layers using interrupted sutures of 0-Vicryl.     Levator myorrhaphy and Perineorrhaphy.   A further dissection was taken out in order to mobilize the levator musculature to allow for a midline plication and closure of the genital hiatus. This was performed with interrupted sutures of 0-Vicryl. The result of the repair was a 1 cm genital hiatus. The posterior repair and perineal body skin was reapproximated with a running locking suture of 2-0 Vicryl internally and a subcuticular suture of the perineal body skin externally.     Anorectal exam showed there to be no injuries or other abnormalities. She had a 16-Tunisian Altman catheter present to gravity drainage. Sponge, lap, and needle counts were found be correct.  There were no complications from surgery. The patient was awoken from anesthesia and brought to the recovery room in excellent condition.

## 2020-09-21 NOTE — ANESTHESIA CARE TRANSFER NOTE
Patient: Irasema Stahl    Procedure(s):  Lefort colpocleisis, enterocele repair, anterior and posterior repair, levator pelvic floor muscle plication, perineorrhaphy, retropubic midurethral sling, cystoscopy  CREATION, VAGINAL SLING, WITH CYSTOSCOPY    Diagnosis: Uterovaginal prolapse [N81.4]  Stress incontinence [N39.3]  Diagnosis Additional Information: No value filed.    Anesthesia Type:   General     Note:  Airway :Face Mask  Patient transferred to:PACU  Comments: [email or web address] Report: Identifed the Patient, Identified the Reponsible Provider, Reviewed the pertinent medical history, Discussed the surgical course, Reviewed Intra-OP anesthesia mangement and issues during anesthesia, Set expectations for post-procedure period and Allowed opportunity for questions and acknowledgement of understanding      Vitals: (Last set prior to Anesthesia Care Transfer)    CRNA VITALS  9/21/2020 1528 - 9/21/2020 1604      9/21/2020             Pulse:  65    SpO2:  98 %                Electronically Signed By: KAROLINE Aviles CRNA  September 21, 2020  4:04 PM

## 2020-09-21 NOTE — PROGRESS NOTES
"UROGYNECOLOGY    POST-OP DAY #1    PROCEDURES :   1. LeFort colpocleisis repair of pelvic organ prolapse.   2. Anterior, Posterior and Enterocele repairs  3. Levator myorrhaphy   4. Perineorrhaphy  4. Retropubic midurethral sling.   5. Cystourethroscopy.     S: Doing well  Ambulating: no  Diet: liquids  Flatus: yes  Pain control: good  Vaginal Pack: none  Altman catheter: in place    O:  /64 (BP Location: Right arm)   Pulse 56   Temp 96.9  F (36.1  C) (Oral)   Resp 16   Ht 1.6 m (5' 3\")   Wt 70 kg (154 lb 5.2 oz)   SpO2 98%   BMI 27.34 kg/m        Intake/Output Summary (Last 24 hours) at 9/22/2020 0533  Last data filed at 9/22/2020 0501  Gross per 24 hour   Intake 1700 ml   Output 2450 ml   Net -750 ml       Appears healthy and well, A&O x3  Abdomen is soft, mons incisions C/D/I, good BS  Ext SCD, no edema  No perineal edema, incisions intact.    HGB:  pre-op GFR 82 creat 0.8   Post-op pending    POD#1  A) Post-Operative Care:    ambulate     ADAT    continue with pain control strategies. Add ice to perineum.    I reviewed post-operative instructions and precautions/ written information provided.    Discharge home around noon today (WHEN MEETING SAFE DISCHARGE CRITERIA). Will be discharged with catheter in place.    Follow-up  1 week, in office, for catheter removal.    B) Medical- stable(good UOP, VSS) restart routine home medications  -HTN  -renal insufficiency  -GERD    Mandi Mosher MD      "

## 2020-09-21 NOTE — ANESTHESIA POSTPROCEDURE EVALUATION
Patient: Irasema Stahl    Procedure(s):  Lefort colpocleisis, enterocele repair, anterior and posterior repair, levator pelvic floor muscle plication, perineorrhaphy, retropubic midurethral sling, cystoscopy  CREATION, VAGINAL SLING, WITH CYSTOSCOPY    Diagnosis:Uterovaginal prolapse [N81.4]  Stress incontinence [N39.3]  Diagnosis Additional Information: No value filed.    Anesthesia Type:  General    Note:  Anesthesia Post Evaluation    Patient location during evaluation: PACU  Patient participation: Able to fully participate in evaluation  Level of consciousness: awake  Pain management: adequate  Airway patency: patent  Cardiovascular status: acceptable  Respiratory status: acceptable  Hydration status: euvolemic  PONV: controlled     Anesthetic complications: None          Last vitals:  Vitals:    09/21/20 1620 09/21/20 1630 09/21/20 1645   BP: 117/68 118/70 117/68   Pulse: 78 69 58   Resp: 9 10 11   Temp:      SpO2: 95% 95% 97%         Electronically Signed By: Inocencio Gilliam MD  September 21, 2020  5:02 PM

## 2020-09-21 NOTE — ANESTHESIA PREPROCEDURE EVALUATION
Anesthesia Pre-Procedure Evaluation    Patient: Irasema Stahl   MRN: [id number] : 1945          Preoperative Diagnosis: Uterovaginal prolapse [N81.4]  Stress incontinence [N39.3]    Procedure(s):  Lefort colpocleisis, enterocele repair, posterior repair, levator pelvic floor muscle plication, perineorrhaphy, retropubic midurethral sling, cystoscopy  CREATION, VAGINAL SLING, WITH CYSTOSCOPY    Past Medical History:   Diagnosis Date     Arthritis     generalized     Ectopic pregnancy      Hypertension      Incontinence in female      Pelvic floor relaxation      Past Surgical History:   Procedure Laterality Date     APPENDECTOMY       AS RAD RESEC TONSIL/PILLARS       ovary duct removal       STRIP VEIN BILATERAL  2017     Anesthesia Evaluation     .             ROS/MED HX    ENT/Pulmonary:  - neg pulmonary ROS     Neurologic:  - neg neurologic ROS     Cardiovascular:     (+) hypertension----. : . . . :. .      (-) CAD, CHF, arrhythmias and pulmonary hypertension   METS/Exercise Tolerance:     Hematologic:         Musculoskeletal:   (+) arthritis,  other musculoskeletal- Chronic Back PAin      GI/Hepatic:        (-) GERD   Renal/Genitourinary:      (-) renal disease   Endo:  - neg endo ROS       Psychiatric:  - neg psychiatric ROS       Infectious Disease:  - neg infectious disease ROS       Malignancy:   (+) Malignancy History of Breast          Other:                          Physical Exam      Airway   Mallampati: II  TM distance: >3 FB  Neck ROM: full    Dental     Cardiovascular   Rhythm and rate: regular and normal  (-) no murmur    Pulmonary    breath sounds clear to auscultation    Other findings: No lab results found.   Lab Test        10/24/19                       0824          NA           140           POTASSIUM    3.4           CHLORIDE     104           CO2          29            BUN          21            CR           0.81          ANIONGAP     6             CRISTIAN          8.5           GLC  "         98                  Lab Results   Component Value Date    HGB 12.5 04/23/2007     10/24/2019    POTASSIUM 3.4 10/24/2019    CHLORIDE 104 10/24/2019    CO2 29 10/24/2019    BUN 21 10/24/2019    CR 0.81 10/24/2019    GLC 98 10/24/2019    CRISTIAN 8.5 10/24/2019       Preop Vitals  BP Readings from Last 3 Encounters:   10/31/19 136/79    Pulse Readings from Last 3 Encounters:   10/31/19 60      Resp Readings from Last 3 Encounters:   No data found for Resp    SpO2 Readings from Last 3 Encounters:   No data found for SpO2      Temp Readings from Last 1 Encounters:   No data found for Temp    Ht Readings from Last 1 Encounters:   09/21/20 1.549 m (5' 1\")      Wt Readings from Last 1 Encounters:   09/21/20 70.3 kg (155 lb)    Estimated body mass index is 29.29 kg/m  as calculated from the following:    Height as of this encounter: 1.549 m (5' 1\").    Weight as of this encounter: 70.3 kg (155 lb).       Anesthesia Plan      History & Physical Review  History and physical reviewed and following examination; no interval change.    ASA Status:  2 .    NPO Status:  > 8 hours    Plan for General with Propofol induction. Maintenance will be Balanced.    PONV prophylaxis:  Ondansetron (or other 5HT-3)         Postoperative Care  Postoperative pain management:  IV analgesics and Oral pain medications.      Consents                 Inocencio Gilliam MD                    .  "

## 2020-09-22 VITALS
DIASTOLIC BLOOD PRESSURE: 52 MMHG | HEART RATE: 64 BPM | SYSTOLIC BLOOD PRESSURE: 115 MMHG | RESPIRATION RATE: 16 BRPM | HEIGHT: 63 IN | TEMPERATURE: 96.5 F | WEIGHT: 154.32 LBS | OXYGEN SATURATION: 98 % | BODY MASS INDEX: 27.34 KG/M2

## 2020-09-22 LAB — HGB BLD-MCNC: 10.2 G/DL (ref 11.7–15.7)

## 2020-09-22 PROCEDURE — 25000128 H RX IP 250 OP 636: Performed by: OBSTETRICS & GYNECOLOGY

## 2020-09-22 PROCEDURE — 85018 HEMOGLOBIN: CPT | Performed by: OBSTETRICS & GYNECOLOGY

## 2020-09-22 PROCEDURE — 36415 COLL VENOUS BLD VENIPUNCTURE: CPT | Performed by: OBSTETRICS & GYNECOLOGY

## 2020-09-22 RX ORDER — HYDROMORPHONE HYDROCHLORIDE 2 MG/1
2 TABLET ORAL EVERY 6 HOURS PRN
Qty: 10 TABLET | Refills: 0 | Status: SHIPPED | OUTPATIENT
Start: 2020-09-22 | End: 2020-09-25

## 2020-09-22 RX ORDER — POLYETHYLENE GLYCOL 3350 17 G/17G
1 POWDER, FOR SOLUTION ORAL DAILY
Qty: 30 PACKET | Refills: 1 | Status: SHIPPED | OUTPATIENT
Start: 2020-09-22

## 2020-09-22 RX ORDER — CIPROFLOXACIN 250 MG/1
250 TABLET, FILM COATED ORAL 2 TIMES DAILY
Qty: 14 TABLET | Refills: 0 | Status: SHIPPED | OUTPATIENT
Start: 2020-09-22 | End: 2020-09-29

## 2020-09-22 RX ORDER — OMEGA-3 FATTY ACIDS/FISH OIL 300-1000MG
200 CAPSULE ORAL EVERY 4 HOURS PRN
Qty: 30 CAPSULE | Refills: 0 | Status: SHIPPED | OUTPATIENT
Start: 2020-09-22

## 2020-09-22 RX ADMIN — KETOROLAC TROMETHAMINE 15 MG: 15 INJECTION, SOLUTION INTRAMUSCULAR; INTRAVENOUS at 04:44

## 2020-09-22 RX ADMIN — KETOROLAC TROMETHAMINE 15 MG: 15 INJECTION, SOLUTION INTRAMUSCULAR; INTRAVENOUS at 10:27

## 2020-09-22 NOTE — PHARMACY-ADMISSION MEDICATION HISTORY
Admission medication history interview status for this patient is complete. See [facility] admission navigator for allergy information, prior to admission medications and immunization status.     Medication history interview done via telephone during Covid-19 pandemic, indicate source(s): Patient  Medication history resources (including written lists, pill bottles, clinic record):EPIC    Changes made to PTA medication list:  Added: Omeprazole prn  Deleted: Bactrim, Pantoprazole, Gabapentin, Probiotic (was taking with abxs)  Changed: many medication to prn, times when meds are taken    Additional medication history information:completed abxs    Medication reconciliation/reorder completed by provider prior to medication history?  Y      Prior to Admission medications    Medication Sig Last Dose Taking? Auth Provider   acetaminophen (TYLENOL) 500 MG tablet Take 500-1,000 mg by mouth every 8 hours as needed  Past Month at Unknown time Yes Reported, Patient   alum & mag hydroxide-simethicone (MAALOX  ES) 400-400-40 MG/5ML SUSP suspension Take 10 mLs by mouth every 4 hours as needed for indigestion Past Month at Unknown time Yes Reported, Patient   amLODIPine (NORVASC) 2.5 MG tablet Take 2.5 mg by mouth At Bedtime  9/20/2020 at 2100 Yes Reported, Patient   aspirin (ASA) 81 MG chewable tablet Take 81 mg by mouth daily  9/13/2020 Yes Reported, Patient   calcium carbonate (TUMS) 500 MG chewable tablet Take 1 chew tab by mouth 2 times daily as needed for heartburn  Yes Unknown, Entered By History   Cholecalciferol (VITAMIN D3) 2000 units CAPS 1 capsule daily  9/20/2020 at 0900 Yes Reported, Patient   conjugated estrogens (PREMARIN) 0.625 MG/GM vaginal cream Place vaginally At Bedtime  Past Week at Unknown time Yes Reported, Patient   diclofenac (VOLTAREN) 1 % topical gel Place onto the skin 4 times daily as needed   Yes Reported, Patient   hydrochlorothiazide (MICROZIDE) 12.5 MG capsule 12.5 mg daily as needed  Past Month at  Unknown time Yes Reported, Patient   Magnesium Oxide 500 MG TABS 1 tablet At Bedtime  9/20/2020 at 2100 Yes Reported, Patient   omeprazole (PRILOSEC) 20 MG DR capsule Take 20 mg by mouth daily as needed  Yes Unknown, Entered By History   polyethylene glycol (MIRALAX) 17 g packet Take 1 packet by mouth daily as needed   Yes Reported, Patient   traZODone (DESYREL) 50 MG tablet 50 mg nightly as needed  Past Month at Unknown time Yes Reported, Patient   vitamin C (ASCORBIC ACID) 1000 MG TABS Take 1,000 mg by mouth daily as needed  9/20/2020 at 0900 Yes Reported, Patient

## 2020-09-22 NOTE — DISCHARGE INSTRUCTIONS
You received 200mg of pyridium at 12:25pm.  This may cause your urine to be temporarily orange in color.      Prolapse/Pelvic Reconstructive Surgery  Instructions for Caring for yourself after Surgery     How do I manage my pain?   Pain and tenderness should lessen each day. To help keep pain under control, use the following guidelines:    Apply ice packs to your perineum (vaginal and rectal area) for the 1st couple of days.    Take 200 milligrams (mg) of ibuprofen (Advil) every 6 hours for the 1st several days.     Use your prescribed narcotic (hydromorphone) for additional pain relief as needed.    Do not drive, drink alcohol or make any major decisions, such as signing important papers or managing legal issues, while taking prescription pain medication.     Take pain medication with food to avoid an upset stomach.    How do I care for my perineum?    Use pads for vaginal discharge after surgery. Discharge is normal and can last several weeks. Discharge may appear bloody, yellow or white.    Do not place anything in your vagina until advised by your doctor.     What about bathing?        You may take showers.    Soak your perineum (bottom area) in water 3 times per day for comfort and hygiene. Bathes are encouraged, even with catheter in place.    What about bowel and bladder management?    Keep stools soft and regular. We recommend using the following medicine that loosens stools and increases bowel movements:  - MiraLAX-  17 grams or a capful daily following surgery.      When urinating, do not bear down. Relax and allow the bladder muscle to contract. If you are unable to urinate, contact your doctor.    You will go home with a catheter, you are prescribed an antibiotic for you to take before bed to help prevent infection. Follow up in the clinic as instructed to have the catheter removed.    What about activity?    Do not lift more than 10 pounds for 12 weeks after surgery. Avoid heavy pushing or pulling, such  as vacuuming or lawn mowing. Your body s tissues need time to heal and regain maximum strength.    Keep Active. Walking is encouraged. Gradually build up how long and far you walk. Climbing stairs is OK if able.        You may resume driving when you are no longer taking narcotic pain medication and have the strength to use the brake pedal as needed.       CATHETER REMOVAL OFFICE VISIT ON 9/28/20    When do I call my doctor?  Call Dr. Mosher call [phone] if you have:     (for urgent questions/concerns CELL PHONE [phone])    -Any post-operative questions or concerns     -A fever over 100.4 F (38 C)      -Difficulty emptying your bladder    -Chills         -Worsening pain                -Nausea or vomiting

## 2020-09-23 LAB — INTERPRETATION ECG - MUSE: NORMAL

## 2020-10-21 ENCOUNTER — HOSPITAL ENCOUNTER (OUTPATIENT)
Dept: CT IMAGING | Facility: CLINIC | Age: 75
Discharge: HOME | End: 2020-10-21
Attending: OBSTETRICS & GYNECOLOGY | Admitting: OBSTETRICS & GYNECOLOGY
Payer: COMMERCIAL

## 2020-10-21 DIAGNOSIS — Z48.816 AFTERCARE FOLLOWING SURGERY OF THE GENITOURINARY SYSTEM: ICD-10-CM

## 2020-10-21 DIAGNOSIS — R10.2 PELVIC PAIN IN FEMALE: ICD-10-CM

## 2020-10-21 PROCEDURE — 74178 CT ABD&PLV WO CNTR FLWD CNTR: CPT

## 2020-10-21 PROCEDURE — 250N000011 HC RX IP 250 OP 636: Performed by: OBSTETRICS & GYNECOLOGY

## 2020-10-21 PROCEDURE — 250N000009 HC RX 250: Performed by: OBSTETRICS & GYNECOLOGY

## 2020-10-21 RX ORDER — IOPAMIDOL 755 MG/ML
100 INJECTION, SOLUTION INTRAVASCULAR ONCE
Status: COMPLETED | OUTPATIENT
Start: 2020-10-21 | End: 2020-10-21

## 2020-10-21 RX ADMIN — IOPAMIDOL 100 ML: 755 INJECTION, SOLUTION INTRAVENOUS at 15:10

## 2020-10-21 RX ADMIN — SODIUM CHLORIDE 60 ML: 9 INJECTION, SOLUTION INTRAVENOUS at 15:10

## (undated) DEVICE — SU VICRYL 0 CT-2 27" J334H

## (undated) DEVICE — LINEN FULL SHEET 5511

## (undated) DEVICE — GLOVE PROTEXIS POWDER FREE SMT 6.5  2D72PT65X

## (undated) DEVICE — BAG CLEAR TRASH 1.3M 39X33" P4040C

## (undated) DEVICE — ADH SKIN CLOSURE PREMIERPRO EXOFIN MICOR HV 0.5ML 3471

## (undated) DEVICE — PACK MAJOR LITHOTOMY RIDGES

## (undated) DEVICE — ESU GROUND PAD ADULT W/CORD E7507

## (undated) DEVICE — DRAPE VAGI BAG 18X9" 1072

## (undated) DEVICE — SPONGE RAY-TEC 4X8" 7318

## (undated) DEVICE — SU VICRYL 2-0 CT-2 27" J333H

## (undated) DEVICE — CATH TRAY FOLEY SURESTEP 16FR DRAIN BAG STATOCK A899916

## (undated) DEVICE — PAD CHUX UNDERPAD 30X36" P3036C

## (undated) DEVICE — CATH FOLEY 16FR 5ML LUBRICATH LATEX 0165L16

## (undated) DEVICE — LINEN ORTHO ACL PACK 5447

## (undated) DEVICE — NDL 18GA 1.5" 305196

## (undated) DEVICE — SUCTION CANISTER MEDIVAC LINER 3000ML W/LID 65651-530

## (undated) DEVICE — SOL NACL 0.9% IRRIG 1000ML BOTTLE 2F7124

## (undated) DEVICE — SU PDS II 2-0 CT-2 27"  Z333H

## (undated) DEVICE — TUBING IRRIG CYSTO/BLADDER SET 81" LF 2C4040

## (undated) DEVICE — SU VICRYL 0 CT-1 27" J340H

## (undated) DEVICE — LINEN HALF SHEET 5512

## (undated) RX ORDER — EPHEDRINE SULFATE 50 MG/ML
INJECTION, SOLUTION INTRAMUSCULAR; INTRAVENOUS; SUBCUTANEOUS
Status: DISPENSED
Start: 2020-09-21

## (undated) RX ORDER — CEFAZOLIN SODIUM 1 G/3ML
INJECTION, POWDER, FOR SOLUTION INTRAMUSCULAR; INTRAVENOUS
Status: DISPENSED
Start: 2020-09-21

## (undated) RX ORDER — FENTANYL CITRATE 50 UG/ML
INJECTION, SOLUTION INTRAMUSCULAR; INTRAVENOUS
Status: DISPENSED
Start: 2020-09-21

## (undated) RX ORDER — ONDANSETRON 2 MG/ML
INJECTION INTRAMUSCULAR; INTRAVENOUS
Status: DISPENSED
Start: 2020-09-21

## (undated) RX ORDER — KETOROLAC TROMETHAMINE 15 MG/ML
INJECTION, SOLUTION INTRAMUSCULAR; INTRAVENOUS
Status: DISPENSED
Start: 2020-09-21

## (undated) RX ORDER — PHENAZOPYRIDINE HYDROCHLORIDE 200 MG/1
TABLET, FILM COATED ORAL
Status: DISPENSED
Start: 2020-09-21

## (undated) RX ORDER — PROPOFOL 10 MG/ML
INJECTION, EMULSION INTRAVENOUS
Status: DISPENSED
Start: 2020-09-21

## (undated) RX ORDER — LIDOCAINE HYDROCHLORIDE 20 MG/ML
JELLY TOPICAL
Status: DISPENSED
Start: 2019-10-31

## (undated) RX ORDER — LIDOCAINE HYDROCHLORIDE AND EPINEPHRINE 10; 10 MG/ML; UG/ML
INJECTION, SOLUTION INFILTRATION; PERINEURAL
Status: DISPENSED
Start: 2020-09-21

## (undated) RX ORDER — BUPIVACAINE HYDROCHLORIDE 5 MG/ML
INJECTION, SOLUTION EPIDURAL; INTRACAUDAL
Status: DISPENSED
Start: 2020-09-21